# Patient Record
Sex: MALE | Race: WHITE | HISPANIC OR LATINO | Employment: UNEMPLOYED | URBAN - METROPOLITAN AREA
[De-identification: names, ages, dates, MRNs, and addresses within clinical notes are randomized per-mention and may not be internally consistent; named-entity substitution may affect disease eponyms.]

---

## 2022-06-27 ENCOUNTER — HOSPITAL ENCOUNTER (EMERGENCY)
Facility: HOSPITAL | Age: 27
Discharge: HOME | End: 2022-06-27
Attending: STUDENT IN AN ORGANIZED HEALTH CARE EDUCATION/TRAINING PROGRAM

## 2022-06-27 VITALS
TEMPERATURE: 99 F | HEART RATE: 82 BPM | DIASTOLIC BLOOD PRESSURE: 88 MMHG | OXYGEN SATURATION: 100 % | RESPIRATION RATE: 16 BRPM | SYSTOLIC BLOOD PRESSURE: 149 MMHG | WEIGHT: 164.38 LBS

## 2022-06-27 DIAGNOSIS — T15.02XA FOREIGN BODY OF LEFT CORNEA, INITIAL ENCOUNTER: Primary | ICD-10-CM

## 2022-06-27 PROCEDURE — 25000003 PHARM REV CODE 250: Performed by: PHYSICIAN ASSISTANT

## 2022-06-27 PROCEDURE — 99281 EMR DPT VST MAYX REQ PHY/QHP: CPT

## 2022-06-27 RX ORDER — TETRACAINE HYDROCHLORIDE 5 MG/ML
2 SOLUTION OPHTHALMIC
Status: DISCONTINUED | OUTPATIENT
Start: 2022-06-27 | End: 2022-06-27 | Stop reason: HOSPADM

## 2022-06-27 RX ORDER — ERYTHROMYCIN 5 MG/G
OINTMENT OPHTHALMIC
Status: DISCONTINUED | OUTPATIENT
Start: 2022-06-27 | End: 2022-06-27 | Stop reason: HOSPADM

## 2022-06-27 NOTE — DISCHARGE INSTRUCTIONS
You will be called in the morning by [facility] to schedule an appt tomorrow. Use ointment as prescribed.

## 2022-06-27 NOTE — ED PROVIDER NOTES
Encounter Date: 6/27/2022       History     Chief Complaint   Patient presents with    Eye Pain     Patient reports L eye redness and sensitivity to light x 2 days, + drainage     Alex Gonzalez is a 26 y.o. male who presents to the ED with complaints of left eye redness and itching that started 2 day(s) ago. 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 He denies pain and difficulty seeing.       The history is provided by the patient. The history is limited by a language barrier. A  was used.     Review of patient's allergies indicates:  No Known Allergies  History reviewed. No pertinent past medical history.  No past surgical history on file.  History reviewed. No pertinent family history.     Review of Systems   Constitutional: Negative for chills, fatigue and fever.   HENT: Negative for congestion, ear pain, sinus pain and sore throat.    Eyes: Positive for discharge, redness and itching. Negative for pain and visual disturbance.   Respiratory: Negative for cough, chest tightness and shortness of breath.    Cardiovascular: Negative for chest pain.   Gastrointestinal: Negative for abdominal pain, constipation, diarrhea, nausea and vomiting.   Genitourinary: Negative for dysuria.   Musculoskeletal: Negative for back pain and joint swelling.   Skin: Negative for color change and rash.   Neurological: Negative for dizziness and weakness.   Psychiatric/Behavioral: Negative for behavioral problems and confusion.       Physical Exam     Initial Vitals [06/27/22 1435]   BP Pulse Resp Temp SpO2   (!) 149/88 82 16 98.8 °F (37.1 °C) 100 %      MAP       --         Physical Exam    Constitutional: He appears well-developed and well-nourished.   HENT:   Head: Normocephalic and atraumatic.   Nose: Nose normal.   Eyes: EOM are normal. Pupils are equal, round, and reactive to light. Left conjunctiva is injected.   Tetracaine  in L eye, fluorescein strip used. Eye visualized under woods lamp. Foreign body noted in L cornea, attempted to remove with qtip and qtip covered in erythromycin ointment   Neck: Neck supple. No JVD present.   Normal range of motion.  Cardiovascular: Normal rate, regular rhythm, normal heart sounds and intact distal pulses.   No murmur heard.  Pulmonary/Chest: Breath sounds normal. No respiratory distress. He has no wheezes. He has no rhonchi. He has no rales.   Abdominal: Abdomen is soft. Bowel sounds are normal. He exhibits no distension. There is no abdominal tenderness. There is no rebound and no guarding.   Musculoskeletal:         General: No tenderness. Normal range of motion.      Cervical back: Normal range of motion and neck supple.     Neurological: He is alert and oriented to person, place, and time. GCS score is 15. GCS eye subscore is 4. GCS verbal subscore is 5. GCS motor subscore is 6.   Skin: Skin is warm. Capillary refill takes less than 2 seconds.   Psychiatric: He has a normal mood and affect. Thought content normal.         ED Course   Procedures  Labs Reviewed - No data to display       Imaging Results    None          Medications   fluorescein ophthalmic strip 1 each (has no administration in time range)   TETRAcaine HCl (PF) 0.5 % Drop 2 drop (has no administration in time range)   erythromycin 5 mg/gram (0.5 %) ophthalmic ointment (has no administration in time range)           APC / Resident Notes:   Actively discussed this pt with bayron while they were in the ED. Agree with her assessment/plan.     History: pt reports eye itching for 2 days. No grinding or specific inciting event. Kinyarwanda speaking so translation services utilized.     Physical exam:   General: Well appearing, nontoxic, no acute distress  Head: Normocephalic Atraumatic  Eyes: EOMI. Left eye mildly injected. Foreign body identified in cornea with questionable rust ring. Negative sidel, otherwise fluorescence uptake  negative. Pain improved with tetracaine.   ENT: Airway patent, no stridor  Neck: supple  Chest: Lungs CTAB, no respiratory distress  Cardiac: RRR, no murmurs, rubs or gallops  Abdomen: soft, no rebound / guarding / rigidity to deep palpation   Musculoskeletal: LE grossly symmetric, nontender  Skin: Normal skin tone  Neuro: A&Ox3; No obvious focal deficit    Assessment/plan. Pt as small impacted foreign body in left corneal. Unable to remove bedside. Kirsten spoke with optho resident who rec clinic f/u tomorrow morning. Pt understanding and will d/c with abx ointment and strict return precautions.  (luis a)         ED Course as of 06/27/22 1654 Mon Jun 27, 2022 1651 Called optho. Unable to remove foreign body. Foreign body appears chronic. Ophtho clinic will call patient in the AM and schedule appt. This was discussed with patient via .  [VJ]      ED Course User Index  [VJ] Kirsten Hurd PA-C             Clinical Impression:   Final diagnoses:  [T15.02XA] Foreign body of left cornea, initial encounter (Primary)          ED Disposition Condition    Discharge Stable        ED Prescriptions     None        Follow-up Information     Follow up With Specialties Details Why Contact Info    [facility] Dept Emergency Medicine In 3 days As needed, If symptoms worsen [address] [address] [phone]    [facility]  In 1 week  [address] [address]           Kirsten Hurd PA-C  06/27/22 1654       Kirsten Hurd PA-C  06/27/22 1650       Je Knight MD  06/28/22 4828

## 2022-06-28 ENCOUNTER — CLINICAL SUPPORT (OUTPATIENT)
Dept: OPHTHALMOLOGY | Facility: CLINIC | Age: 27
End: 2022-06-28

## 2022-06-28 VITALS — WEIGHT: 164.44 LBS | BODY MASS INDEX: 24.36 KG/M2 | HEIGHT: 69 IN

## 2022-06-28 DIAGNOSIS — T15.02XA FOREIGN BODY OF LEFT CORNEA, INITIAL ENCOUNTER: Primary | ICD-10-CM

## 2022-06-28 PROCEDURE — 99212 OFFICE O/P EST SF 10 MIN: CPT | Mod: PBBFAC,PO | Performed by: STUDENT IN AN ORGANIZED HEALTH CARE EDUCATION/TRAINING PROGRAM

## 2022-06-28 RX ORDER — MOXIFLOXACIN 5 MG/ML
1 SOLUTION/ DROPS OPHTHALMIC
Status: COMPLETED | OUTPATIENT
Start: 2022-06-28 | End: 2022-06-28

## 2022-06-28 RX ADMIN — MOXIFLOXACIN 1 DROP: 5 SOLUTION/ DROPS OPHTHALMIC at 10:06

## 2022-06-28 NOTE — PROGRESS NOTES
HPI     Foreign Body in Eye      Additional comments: OS for the last three days              Comments     Woke with redness and pain in OS three days ago  Was seen in ED yesterday and has FB in OS          Last edited by Lin Silva MA on 6/28/2022  9:26 AM. (History)      Assessment /Plan     For exam results, see Encounter Report.    Foreign body of left cornea, initial encounter    Other orders  -     moxifloxacin 0.5 % ophthalmic solution 1 drop      1. Corneal foreign body, left  - Removed at the slit lamp with residual <1mm epi defect; no rust ring remaining   - No surrounding infiltrate  - Recommend use of protective glasses while working  - Recommend vigamox qid, qid eye  - Will have patient return to clinic 1 week for K check, dfe    RTC 1 week K check, dfe    Procedure: Corneal FB removal, left  Risks, benefits, and alternatives of procedure were discussed.  Patient voiced understanding, all questions were answered, and the patient elected to proceed with procedure.  Informed consent was obtained. A time-out was performed confirming correct patient, procedure, and site. The eye was anesthetized with topical tetracaine. Left eye was sterilized with vigamox. 30 gauge needle placed on a 3ml syringe was used to remove foreign body. Epidefect present where foreign body was removed. Patient tolerated procedure well. No complications.